# Patient Record
Sex: FEMALE | Race: WHITE | NOT HISPANIC OR LATINO
[De-identification: names, ages, dates, MRNs, and addresses within clinical notes are randomized per-mention and may not be internally consistent; named-entity substitution may affect disease eponyms.]

---
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
## 2020-01-02 ENCOUNTER — RX RENEWAL (OUTPATIENT)
Age: 20
End: 2020-01-02

## 2020-01-02 DIAGNOSIS — E03.1 CONGENITAL HYPOTHYROIDISM W/OUT GOITER: ICD-10-CM

## 2020-01-02 LAB
25(OH)D3 SERPL-MCNC: 15.4 NG/ML
ALBUMIN SERPL ELPH-MCNC: 4.6 G/DL
ALP BLD-CCNC: 58 U/L
ALT SERPL-CCNC: 12 U/L
ANION GAP SERPL CALC-SCNC: 14 MMOL/L
AST SERPL-CCNC: 13 U/L
BASOPHILS # BLD AUTO: 0.04 K/UL
BASOPHILS NFR BLD AUTO: 0.5 %
BILIRUB SERPL-MCNC: 0.2 MG/DL
BUN SERPL-MCNC: 7 MG/DL
CALCIUM SERPL-MCNC: 9.4 MG/DL
CHLORIDE SERPL-SCNC: 103 MMOL/L
CO2 SERPL-SCNC: 22 MMOL/L
CREAT SERPL-MCNC: 0.57 MG/DL
EOSINOPHIL # BLD AUTO: 0.15 K/UL
EOSINOPHIL NFR BLD AUTO: 1.9 %
GLUCOSE SERPL-MCNC: 85 MG/DL
HCT VFR BLD CALC: 35.9 %
HGB BLD-MCNC: 11.5 G/DL
IMM GRANULOCYTES NFR BLD AUTO: 0.3 %
LYMPHOCYTES # BLD AUTO: 2.32 K/UL
LYMPHOCYTES NFR BLD AUTO: 29.7 %
MAN DIFF?: NORMAL
MCHC RBC-ENTMCNC: 28.2 PG
MCHC RBC-ENTMCNC: 32 GM/DL
MCV RBC AUTO: 88 FL
MONOCYTES # BLD AUTO: 0.54 K/UL
MONOCYTES NFR BLD AUTO: 6.9 %
NEUTROPHILS # BLD AUTO: 4.73 K/UL
NEUTROPHILS NFR BLD AUTO: 60.7 %
PLATELET # BLD AUTO: 326 K/UL
POTASSIUM SERPL-SCNC: 3.9 MMOL/L
PROT SERPL-MCNC: 7.7 G/DL
RBC # BLD: 4.08 M/UL
RBC # FLD: 13.2 %
SODIUM SERPL-SCNC: 139 MMOL/L
T3 SERPL-MCNC: 90 NG/DL
T3RU NFR SERPL: 1.1 TBI
T4 FREE SERPL-MCNC: 1.1 NG/DL
T4 SERPL-MCNC: 7 UG/DL
THYROGLOB AB SERPL-ACNC: <20 IU/ML
THYROPEROXIDASE AB SERPL IA-ACNC: <10 IU/ML
TSH SERPL-ACNC: 23.2 UIU/ML
WBC # FLD AUTO: 7.8 K/UL

## 2020-01-02 RX ORDER — ERGOCALCIFEROL 1.25 MG/1
1.25 MG CAPSULE ORAL
Qty: 4 | Refills: 2 | Status: ACTIVE | COMMUNITY
Start: 2020-01-02 | End: 1900-01-01

## 2020-01-06 LAB — T4 FREE SERPL DIALY-MCNC: 1 NG/DL

## 2020-01-10 LAB — T3REVERSE SERPL-MCNC: 14.1 NG/DL

## 2020-02-14 ENCOUNTER — RX RENEWAL (OUTPATIENT)
Age: 20
End: 2020-02-14

## 2020-04-09 ENCOUNTER — RX RENEWAL (OUTPATIENT)
Age: 20
End: 2020-04-09

## 2020-04-09 RX ORDER — ERGOCALCIFEROL 1.25 MG/1
1.25 MG CAPSULE, LIQUID FILLED ORAL
Qty: 12 | Refills: 0 | Status: ACTIVE | COMMUNITY
Start: 2020-04-09 | End: 1900-01-01

## 2020-05-04 ENCOUNTER — APPOINTMENT (OUTPATIENT)
Dept: ENDOCRINOLOGY | Facility: CLINIC | Age: 20
End: 2020-05-04

## 2020-08-26 ENCOUNTER — APPOINTMENT (OUTPATIENT)
Dept: ENDOCRINOLOGY | Facility: CLINIC | Age: 20
End: 2020-08-26

## 2021-02-02 ENCOUNTER — RX RENEWAL (OUTPATIENT)
Age: 21
End: 2021-02-02

## 2021-09-07 ENCOUNTER — RX RENEWAL (OUTPATIENT)
Age: 21
End: 2021-09-07

## 2022-03-17 ENCOUNTER — EMERGENCY (EMERGENCY)
Facility: HOSPITAL | Age: 22
LOS: 1 days | Discharge: HOME | End: 2022-03-17
Attending: EMERGENCY MEDICINE | Admitting: EMERGENCY MEDICINE
Payer: MEDICAID

## 2022-03-17 VITALS
OXYGEN SATURATION: 100 % | DIASTOLIC BLOOD PRESSURE: 73 MMHG | TEMPERATURE: 99 F | RESPIRATION RATE: 16 BRPM | SYSTOLIC BLOOD PRESSURE: 110 MMHG | HEART RATE: 114 BPM

## 2022-03-17 VITALS
OXYGEN SATURATION: 100 % | TEMPERATURE: 98 F | HEART RATE: 118 BPM | RESPIRATION RATE: 17 BRPM | DIASTOLIC BLOOD PRESSURE: 81 MMHG | SYSTOLIC BLOOD PRESSURE: 125 MMHG

## 2022-03-17 LAB
ALBUMIN SERPL ELPH-MCNC: 4.6 G/DL — SIGNIFICANT CHANGE UP (ref 3.3–5)
ALP SERPL-CCNC: 63 U/L — SIGNIFICANT CHANGE UP (ref 40–120)
ALT FLD-CCNC: 13 U/L — SIGNIFICANT CHANGE UP (ref 4–33)
ANION GAP SERPL CALC-SCNC: 15 MMOL/L — HIGH (ref 7–14)
APPEARANCE UR: SIGNIFICANT CHANGE UP
AST SERPL-CCNC: 12 U/L — SIGNIFICANT CHANGE UP (ref 4–32)
BACTERIA # UR AUTO: ABNORMAL
BASE EXCESS BLDV CALC-SCNC: -0.1 MMOL/L — SIGNIFICANT CHANGE UP (ref -2–3)
BASOPHILS # BLD AUTO: 0.04 K/UL — SIGNIFICANT CHANGE UP (ref 0–0.2)
BASOPHILS NFR BLD AUTO: 0.2 % — SIGNIFICANT CHANGE UP (ref 0–2)
BILIRUB SERPL-MCNC: 0.6 MG/DL — SIGNIFICANT CHANGE UP (ref 0.2–1.2)
BILIRUB UR-MCNC: NEGATIVE — SIGNIFICANT CHANGE UP
BLOOD GAS VENOUS COMPREHENSIVE RESULT: SIGNIFICANT CHANGE UP
BUN SERPL-MCNC: 8 MG/DL — SIGNIFICANT CHANGE UP (ref 7–23)
CALCIUM SERPL-MCNC: 9.7 MG/DL — SIGNIFICANT CHANGE UP (ref 8.4–10.5)
CHLORIDE BLDV-SCNC: 100 MMOL/L — SIGNIFICANT CHANGE UP (ref 96–108)
CHLORIDE SERPL-SCNC: 98 MMOL/L — SIGNIFICANT CHANGE UP (ref 98–107)
CO2 BLDV-SCNC: 26.8 MMOL/L — HIGH (ref 22–26)
CO2 SERPL-SCNC: 22 MMOL/L — SIGNIFICANT CHANGE UP (ref 22–31)
COLOR SPEC: YELLOW — SIGNIFICANT CHANGE UP
COMMENT - URINE: SIGNIFICANT CHANGE UP
CREAT SERPL-MCNC: 0.69 MG/DL — SIGNIFICANT CHANGE UP (ref 0.5–1.3)
DIFF PNL FLD: ABNORMAL
EGFR: 127 ML/MIN/1.73M2 — SIGNIFICANT CHANGE UP
EOSINOPHIL # BLD AUTO: 0.01 K/UL — SIGNIFICANT CHANGE UP (ref 0–0.5)
EOSINOPHIL NFR BLD AUTO: 0 % — SIGNIFICANT CHANGE UP (ref 0–6)
EPI CELLS # UR: 5 /HPF — SIGNIFICANT CHANGE UP (ref 0–5)
FLUAV AG NPH QL: SIGNIFICANT CHANGE UP
FLUBV AG NPH QL: SIGNIFICANT CHANGE UP
GAS PNL BLDV: 135 MMOL/L — LOW (ref 136–145)
GLUCOSE BLDV-MCNC: 108 MG/DL — HIGH (ref 70–99)
GLUCOSE SERPL-MCNC: 104 MG/DL — HIGH (ref 70–99)
GLUCOSE UR QL: NEGATIVE — SIGNIFICANT CHANGE UP
HCG SERPL-ACNC: <5 MIU/ML — SIGNIFICANT CHANGE UP
HCO3 BLDV-SCNC: 25 MMOL/L — SIGNIFICANT CHANGE UP (ref 22–29)
HCT VFR BLD CALC: 36.4 % — SIGNIFICANT CHANGE UP (ref 34.5–45)
HCT VFR BLDA CALC: 35 % — SIGNIFICANT CHANGE UP (ref 34.5–46.5)
HGB BLD CALC-MCNC: 11.7 G/DL — SIGNIFICANT CHANGE UP (ref 11.5–15.5)
HGB BLD-MCNC: 11.6 G/DL — SIGNIFICANT CHANGE UP (ref 11.5–15.5)
IANC: 18.93 K/UL — HIGH (ref 1.5–8.5)
IMM GRANULOCYTES NFR BLD AUTO: 0.6 % — SIGNIFICANT CHANGE UP (ref 0–1.5)
KETONES UR-MCNC: ABNORMAL
LACTATE BLDV-MCNC: 2.1 MMOL/L — HIGH (ref 0.5–2)
LEUKOCYTE ESTERASE UR-ACNC: NEGATIVE — SIGNIFICANT CHANGE UP
LIDOCAIN IGE QN: 15 U/L — SIGNIFICANT CHANGE UP (ref 7–60)
LYMPHOCYTES # BLD AUTO: 2.11 K/UL — SIGNIFICANT CHANGE UP (ref 1–3.3)
LYMPHOCYTES # BLD AUTO: 9 % — LOW (ref 13–44)
MCHC RBC-ENTMCNC: 27.5 PG — SIGNIFICANT CHANGE UP (ref 27–34)
MCHC RBC-ENTMCNC: 31.9 GM/DL — LOW (ref 32–36)
MCV RBC AUTO: 86.3 FL — SIGNIFICANT CHANGE UP (ref 80–100)
MONOCYTES # BLD AUTO: 2.12 K/UL — HIGH (ref 0–0.9)
MONOCYTES NFR BLD AUTO: 9.1 % — SIGNIFICANT CHANGE UP (ref 2–14)
NEUTROPHILS # BLD AUTO: 18.93 K/UL — HIGH (ref 1.8–7.4)
NEUTROPHILS NFR BLD AUTO: 81.1 % — HIGH (ref 43–77)
NITRITE UR-MCNC: NEGATIVE — SIGNIFICANT CHANGE UP
NRBC # BLD: 0 /100 WBCS — SIGNIFICANT CHANGE UP
NRBC # FLD: 0 K/UL — SIGNIFICANT CHANGE UP
PCO2 BLDV: 44 MMHG — HIGH (ref 39–42)
PH BLDV: 7.37 — SIGNIFICANT CHANGE UP (ref 7.32–7.43)
PH UR: 6 — SIGNIFICANT CHANGE UP (ref 5–8)
PLATELET # BLD AUTO: 331 K/UL — SIGNIFICANT CHANGE UP (ref 150–400)
PO2 BLDV: 28 MMHG — SIGNIFICANT CHANGE UP
POTASSIUM BLDV-SCNC: 3.8 MMOL/L — SIGNIFICANT CHANGE UP (ref 3.5–5.1)
POTASSIUM SERPL-MCNC: 3.7 MMOL/L — SIGNIFICANT CHANGE UP (ref 3.5–5.3)
POTASSIUM SERPL-SCNC: 3.7 MMOL/L — SIGNIFICANT CHANGE UP (ref 3.5–5.3)
PROT SERPL-MCNC: 7.9 G/DL — SIGNIFICANT CHANGE UP (ref 6–8.3)
PROT UR-MCNC: ABNORMAL
RBC # BLD: 4.22 M/UL — SIGNIFICANT CHANGE UP (ref 3.8–5.2)
RBC # FLD: 13.4 % — SIGNIFICANT CHANGE UP (ref 10.3–14.5)
RBC CASTS # UR COMP ASSIST: 25 /HPF — SIGNIFICANT CHANGE UP (ref 0–4)
RSV RNA NPH QL NAA+NON-PROBE: SIGNIFICANT CHANGE UP
SAO2 % BLDV: 43.5 % — SIGNIFICANT CHANGE UP
SARS-COV-2 RNA SPEC QL NAA+PROBE: SIGNIFICANT CHANGE UP
SODIUM SERPL-SCNC: 135 MMOL/L — SIGNIFICANT CHANGE UP (ref 135–145)
SP GR SPEC: 1.05 — SIGNIFICANT CHANGE UP (ref 1–1.05)
UROBILINOGEN FLD QL: ABNORMAL
WBC # BLD: 23.35 K/UL — HIGH (ref 3.8–10.5)
WBC # FLD AUTO: 23.35 K/UL — HIGH (ref 3.8–10.5)
WBC UR QL: 3 /HPF — SIGNIFICANT CHANGE UP (ref 0–5)

## 2022-03-17 PROCEDURE — 99285 EMERGENCY DEPT VISIT HI MDM: CPT

## 2022-03-17 PROCEDURE — 74177 CT ABD & PELVIS W/CONTRAST: CPT | Mod: 26,MA

## 2022-03-17 PROCEDURE — 76857 US EXAM PELVIC LIMITED: CPT | Mod: 26

## 2022-03-17 RX ORDER — KETOROLAC TROMETHAMINE 30 MG/ML
30 SYRINGE (ML) INJECTION ONCE
Refills: 0 | Status: DISCONTINUED | OUTPATIENT
Start: 2022-03-17 | End: 2022-03-17

## 2022-03-17 RX ORDER — ONDANSETRON 8 MG/1
4 TABLET, FILM COATED ORAL ONCE
Refills: 0 | Status: COMPLETED | OUTPATIENT
Start: 2022-03-17 | End: 2022-03-17

## 2022-03-17 RX ORDER — MOXIFLOXACIN HYDROCHLORIDE TABLETS, 400 MG 400 MG/1
1 TABLET, FILM COATED ORAL
Qty: 14 | Refills: 0
Start: 2022-03-17 | End: 2022-03-23

## 2022-03-17 RX ORDER — SODIUM CHLORIDE 9 MG/ML
1000 INJECTION INTRAMUSCULAR; INTRAVENOUS; SUBCUTANEOUS ONCE
Refills: 0 | Status: COMPLETED | OUTPATIENT
Start: 2022-03-17 | End: 2022-03-17

## 2022-03-17 RX ORDER — CIPROFLOXACIN LACTATE 400MG/40ML
500 VIAL (ML) INTRAVENOUS ONCE
Refills: 0 | Status: COMPLETED | OUTPATIENT
Start: 2022-03-17 | End: 2022-03-17

## 2022-03-17 RX ORDER — METRONIDAZOLE 500 MG
500 TABLET ORAL ONCE
Refills: 0 | Status: COMPLETED | OUTPATIENT
Start: 2022-03-17 | End: 2022-03-17

## 2022-03-17 RX ORDER — METRONIDAZOLE 500 MG
1 TABLET ORAL
Qty: 21 | Refills: 0
Start: 2022-03-17 | End: 2022-03-23

## 2022-03-17 RX ORDER — MULTIVIT WITH MIN/MFOLATE/K2 340-15/3 G
1 POWDER (GRAM) ORAL ONCE
Refills: 0 | Status: COMPLETED | OUTPATIENT
Start: 2022-03-17 | End: 2022-03-17

## 2022-03-17 RX ORDER — FAMOTIDINE 10 MG/ML
20 INJECTION INTRAVENOUS ONCE
Refills: 0 | Status: COMPLETED | OUTPATIENT
Start: 2022-03-17 | End: 2022-03-17

## 2022-03-17 RX ORDER — ACETAMINOPHEN 500 MG
650 TABLET ORAL ONCE
Refills: 0 | Status: COMPLETED | OUTPATIENT
Start: 2022-03-17 | End: 2022-03-17

## 2022-03-17 RX ADMIN — Medication 30 MILLIGRAM(S): at 14:08

## 2022-03-17 RX ADMIN — Medication 500 MILLIGRAM(S): at 18:57

## 2022-03-17 RX ADMIN — ONDANSETRON 4 MILLIGRAM(S): 8 TABLET, FILM COATED ORAL at 14:08

## 2022-03-17 RX ADMIN — Medication 650 MILLIGRAM(S): at 14:04

## 2022-03-17 RX ADMIN — Medication 1 BOTTLE: at 14:27

## 2022-03-17 RX ADMIN — Medication 30 MILLILITER(S): at 14:04

## 2022-03-17 RX ADMIN — FAMOTIDINE 20 MILLIGRAM(S): 10 INJECTION INTRAVENOUS at 14:12

## 2022-03-17 RX ADMIN — SODIUM CHLORIDE 1000 MILLILITER(S): 9 INJECTION INTRAMUSCULAR; INTRAVENOUS; SUBCUTANEOUS at 14:23

## 2022-03-17 NOTE — ED PROVIDER NOTE - CLINICAL SUMMARY MEDICAL DECISION MAKING FREE TEXT BOX
Patient is a 21 year-old-female with no past medical history presents with 3-day history of LLQ/LUQ abdominal pain and nausea. Mildly tachycardic. Most likely gastroenteritis/colitis/constipation. However will r/o ovarian torsion/cyst. Will also evaluate for urinary tract infection. CBC, CMP, VBG, lipase, UA, UC. Symptomatic treatment with fluids, tylenol, maalox, pepcid, toradol, zofran and mg citrate. Transabdominal u/s to r/o torsion. Patient is a 21 year-old-female with no past medical history presents with 3-day history of LLQ/LUQ abdominal pain and nausea. Mildly tachycardic. Most likely gastroenteritis/colitis/constipation. However will r/o ovarian torsion/cyst. Will also evaluate for urinary tract infection. CBC, CMP, VBG, lipase, UA, UC. Symptomatic treatment with fluids, tylenol, maalox, pepcid, toradol, zofran and mg citrate. Transabdominal u/s to r/o torsion. Pelvic exam deferred since patient has never been sexually active.

## 2022-03-17 NOTE — ED ADULT NURSE NOTE - NSIMPLEMENTINTERV_GEN_ALL_ED
Implemented All Universal Safety Interventions:  [place] to call system. Call bell, personal items and telephone within reach. Instruct patient to call for assistance. Room bathroom lighting operational. Non-slip footwear when patient is off stretcher. Physically safe environment: no spills, clutter or unnecessary equipment. Stretcher in lowest position, wheels locked, appropriate side rails in place.

## 2022-03-17 NOTE — ED PROVIDER NOTE - PATIENT PORTAL LINK FT
You can access the FollowMyHealth Patient Portal offered by [facility] by registering at the following website: [email or web address]. By joining [employer]’s FollowMyHealth portal, you will also be able to view your health information using other applications (apps) compatible with our system.

## 2022-03-17 NOTE — ED PROVIDER NOTE - ATTENDING CONTRIBUTION TO CARE
I performed a face-to-face evaluation of the patient and performed a history and physical examination. I agree with the history and physical examination. If this was a PA visit, I personally saw the patient with the PA and performed a substantive portion of the visit including all aspects of the medical decision making.    Three days ago, ate Chinese food noodles. Since then, has had roving abdominal pain between right upper quadrant to the left lower quadrant and left upper quadrant. Has not had a bowel movement for three days. Does not feel impaction. No fevers/vomiting/diarrhea. Physical exam reveals tenderness in several locations in the afternoon but no distention. Differential diagnosis include food poisoning-related constipation. Consider also appendicitis. Give magnesium citrate. If no response with relief of pain, pursue labs and imaging.

## 2022-03-17 NOTE — ED PROVIDER NOTE - PHYSICAL EXAMINATION
General: non-toxic appearing, in no respiratory distress  HEENT: atraumatic, normocephalic; pupils are equal, round and react to light, extraocular movements intact bilaterally without deficits, no conjunctival pallor, mucous membranes moist  Neck: no jugular venous distension, full range of motion  Chest/Lung: clear to auscultation bilaterally, no wheezes/rhonchi/rales  Heart: regular rate and rhythm, no murmur/gallops/rubs  Abdomen: normal bowel sounds, soft, tenderness in LLQ and LUQ  Extremities: no lower extremity edema, +2 radial pulses bilaterally, +2 dorsalis pedis pulses bilaterally  Musculoskeletal: full range of motion of all 4 extremities  Nervous System: alert and oriented, no motor deficits or sensory deficits; CNII-XII grossly intact; no focal neurologic deficits  Skin: no rashes/lacerations noted  Pelvic exam: deferred since patient has never been sexually active General: non-toxic appearing, in no respiratory distress  HEENT: atraumatic, normocephalic; pupils are equal, round and react to light, extraocular movements intact bilaterally without deficits, no conjunctival pallor, mucous membranes moist  Neck: no jugular venous distension, full range of motion  Chest/Lung: clear to auscultation bilaterally, no wheezes/rhonchi/rales  Heart: regular rate and rhythm, no murmur/gallops/rubs  Abdomen: normal bowel sounds, soft, mild tenderness in LLQ and LUQ  Extremities: no lower extremity edema, +2 radial pulses bilaterally, +2 dorsalis pedis pulses bilaterally  Musculoskeletal: full range of motion of all 4 extremities  Nervous System: alert and oriented, no motor deficits or sensory deficits; CNII-XII grossly intact; no focal neurologic deficits  Skin: no rashes/lacerations noted  Pelvic exam: deferred since patient has never been sexually active

## 2022-03-17 NOTE — ED PROVIDER NOTE - NSFOLLOWUPCLINICS_GEN_ALL_ED_FT
Gastroenterology at [facility]  Gastroenterology  [address] [place] [address]  Phone: [phone]  Fax:     [facility] [place]  Gastroenterology  [address] [place] [address]  Phone: [phone]  Fax:     [facility]  Gastroenterology  [address]  [place] [address]  Phone: [phone]  Fax:

## 2022-03-17 NOTE — ED ADULT NURSE NOTE - OBJECTIVE STATEMENT
Patient AAOx4, ambulatory at baseline. Patient coming to ER complaining of abdominal pain, and nausea. Patient states she has not had a bowel movement since Monday morning. Patient denies chest pain, headache and dizziness at this time. Breathing even and unlabored. Patient states she has not experienced any episodes of vomiting at this time. Patient family member at bedside noted that the patient has tried multiple modalities to try to have a bowel movement. Patient abdomen distended and tender upon palpation. Bowel sounds present in all four quadrants. No pallor or diaphoresis noted at this time.  Patient denies the use of any medications or having any medical problems. #20g placed to LAC. Labs drawn and sent as per MD order.

## 2022-03-17 NOTE — ED PROVIDER NOTE - OBJECTIVE STATEMENT
Patient is a 21 year-old-female with no past medical history presents with 3-day history of abdominal pain. Started on Monday, few hours after eating some noddles per patient, pain mostly in the left side of the abdomen, +nausea. Has not had any PO intake since Monday due to fear of pain and nausea, also has not had a bowel movement since then. Never had similar pain before. Denies fever, chills, dysuria, hematuria, increased urinary frequency.   LMP last month, never sexually active.

## 2022-03-17 NOTE — ED PROVIDER NOTE - PROGRESS NOTE DETAILS
[place] PGY1   Patient reports no improvement in her symptoms with medications. Tenderness noted over the LLQ and LUQ. Will pursue CT abd/pel. [facility] PGY1   CT abd/pel demonstrated infectious/inflammatory colitis of the descending and proximal sigmoid colon and also findings suggesting inflammatory bowel disease (possibly ulcerative colitis).   Patient appears nontoxic. Will discharge with GI follow up.   Patient and mother at bedside updated. [facility] PGY1  Patient's HR is still elevated at 114, most likely due to dehydration in the setting of poor oral intake for 3 days. Patient still reports persistent pain. Offers CDU admission for better pain management and IV hydration with GI consult in the afternoon. However mother declines CDU admission and would like to take her daughter to another emergency department. GI referral list provided to mother.  Expedited GI follow up. [facility] PGY1   CT abd/pel demonstrated infectious/inflammatory colitis of the descending and proximal sigmoid colon and also findings suggesting inflammatory bowel disease (possibly ulcerative colitis).   Patient appears nontoxic. Will discharge with GI follow up. [place] PGY1   Patient reports no improvement in her symptoms with medications. Mild tenderness noted over the LLQ and LUQ. Will pursue CT abd/pel. Shahzad: Pt. w/ colitis. Although no V/D/F, has chills. Will use Cipro/Flagyl and Miralax.

## 2022-03-17 NOTE — ED PROVIDER NOTE - NSFOLLOWUPINSTRUCTIONS_ED_ALL_ED_FT
You were seen in the emergency department for abdominal pain and nausea. You were given fluids, tylenol, maalox, pepcid, toradol, zofran and magnesium citrate.     For pain, you may take Tylenol (acetaminophen) and/or ibuprofen (advil or motrin). Please follow the instructions on the label/container.     Please follow up with your primary care doctor within 48 hours for continuation of care.   Please follow up with an GI doctor within a week for further management of colitis and possible inflammatory bowel disease.     Return to the emergency department if you experience any new/concerning/worsening symptoms such as but not limited to: fever (>100.3F), intractable nausea, vomiting, severe abdominal pain.  ==========================  Ulcerative Colitis    WHAT YOU NEED TO KNOW:  What is ulcerative colitis? Ulcerative colitis is a chronic disease of the colon (large intestine). Inflammation and ulcers form on the inner lining of your colon. Ulcerative colitis is a type of inflammatory bowel disease. It usually starts between the ages of 15 and 30 years old.    What are the signs and symptoms of ulcerative colitis?   •Diarrhea that may have mucus or blood  •Bleeding from your rectum  •Urgent bowel movements  •Abdominal tenderness and bloating  •Fever, poor appetite, weight loss, fatigue    How is ulcerative colitis diagnosed?   •A rectal exam may show blood. Your healthcare provider will put a gloved finger inside your rectum. He or she will feel for inflammation or blockage.   •A CT or MRI may show a blockage, an abscess, inflammation, or abnormal connection. You may be given contrast liquid to help your colon show up better in the pictures. Tell the healthcare provider if you have ever had an allergic reaction to contrast liquid. Do not enter the MRI room with anything metal. Metal can cause serious injury. Tell the healthcare provider if you have any metal in or on your body.  •Endoscopy is a procedure used to find the cause of your ulcerative colitis. An endoscope is a bendable tube with a light and camera on the end. A small sample of tissue and bowel movement may be removed. These are sent to a lab for testing.     How is ulcerative colitis treated?   •Medicines may be given to help decrease inflammation or control your immune system. You may need to take more than 1 medicine to treat your ulcerative colitis.  •Surgery may be needed to remove part or all of your colon. Ask about the different kinds of surgery that can be done to help your symptoms.     How can I manage my ulcerative colitis?   •Do not take NSAID medicines, including aspirin and ibuprofen. NSAIDs can cause flare-ups.  •Eat a variety of healthy foods to keep your colon healthy. Healthy foods include fruit, vegetables, whole-grain breads, low-fat dairy products, beans, lean meat, and fish. Do not eat foods that make your symptoms worse. Your healthcare provider may give you vitamins or minerals to improve your nutrition if you have severe ulcerative colitis.   •Drink liquids as directed. Ask how much liquid to drink each day and which liquids are best for you. For most people, water, juice, and milk are good choices. Do not drink alcohol. This can make your symptoms worse.  •Exercise regularly. Ask about the best exercise plan for you. Any activity is better than none. Even 10 minutes a few times a day would help prevent constipation and help keep your colon healthy.   •Manage stress. Stress may slow healing and cause illness. Learn new ways to relax, such as deep breathing.     Call, or have someone call, your local emergency number (911 in the [place]) if:   •You have sudden trouble breathing.  •You have a fast heart rate, fast breathing, or are too dizzy to stand up.    When should I seek immediate care?   •You have severe abdominal pain.  •Your vomit has blood in it or looks like coffee grounds.  •You see blood in your bowel movement.     When should I call my doctor?   •You have a fever, chills, a cough, or feel weak and achy.  •You have abdominal pain that does not go away or gets worse after you take medicine.  •Your abdomen is swollen.   •You lose weight without trying.  •You have questions or concerns about your condition or care.    CARE AGREEMENT:    You have the right to help plan your care. Learn about your health condition and how it may be treated. Discuss treatment options with your healthcare providers to decide what care you want to receive. You always have the right to refuse treatment. You were seen in the emergency department for abdominal pain and nausea. You were given fluids, tylenol, maalox, pepcid, toradol, zofran and magnesium citrate. Your workup includes bloodwork, urinalysis, urine culture, ultrasound of your pelvis and also a CT scan of your abdomen and pelvis.    Please follow up with your primary care doctor within 48 hours for continuation of care.   Please follow up with an GI doctor within a week for further management of colitis and possible inflammatory bowel disease.     Return to the emergency department if you experience any new/concerning/worsening symptoms such as but not limited to: fever (>100.3F), intractable nausea, vomiting, severe abdominal pain.  ==========================  Ulcerative Colitis    WHAT YOU NEED TO KNOW:  What is ulcerative colitis? Ulcerative colitis is a chronic disease of the colon (large intestine). Inflammation and ulcers form on the inner lining of your colon. Ulcerative colitis is a type of inflammatory bowel disease. It usually starts between the ages of 15 and 30 years old.    What are the signs and symptoms of ulcerative colitis?   •Diarrhea that may have mucus or blood  •Bleeding from your rectum  •Urgent bowel movements  •Abdominal tenderness and bloating  •Fever, poor appetite, weight loss, fatigue    How is ulcerative colitis diagnosed?   •A rectal exam may show blood. Your healthcare provider will put a gloved finger inside your rectum. He or she will feel for inflammation or blockage.   •A CT or MRI may show a blockage, an abscess, inflammation, or abnormal connection. You may be given contrast liquid to help your colon show up better in the pictures. Tell the healthcare provider if you have ever had an allergic reaction to contrast liquid. Do not enter the MRI room with anything metal. Metal can cause serious injury. Tell the healthcare provider if you have any metal in or on your body.  •Endoscopy is a procedure used to find the cause of your ulcerative colitis. An endoscope is a bendable tube with a light and camera on the end. A small sample of tissue and bowel movement may be removed. These are sent to a lab for testing.     How is ulcerative colitis treated?   •Medicines may be given to help decrease inflammation or control your immune system. You may need to take more than 1 medicine to treat your ulcerative colitis.  •Surgery may be needed to remove part or all of your colon. Ask about the different kinds of surgery that can be done to help your symptoms.     How can I manage my ulcerative colitis?   •Do not take NSAID medicines, including aspirin and ibuprofen. NSAIDs can cause flare-ups.  •Eat a variety of healthy foods to keep your colon healthy. Healthy foods include fruit, vegetables, whole-grain breads, low-fat dairy products, beans, lean meat, and fish. Do not eat foods that make your symptoms worse. Your healthcare provider may give you vitamins or minerals to improve your nutrition if you have severe ulcerative colitis.   •Drink liquids as directed. Ask how much liquid to drink each day and which liquids are best for you. For most people, water, juice, and milk are good choices. Do not drink alcohol. This can make your symptoms worse.  •Exercise regularly. Ask about the best exercise plan for you. Any activity is better than none. Even 10 minutes a few times a day would help prevent constipation and help keep your colon healthy.   •Manage stress. Stress may slow healing and cause illness. Learn new ways to relax, such as deep breathing.     Call, or have someone call, your local emergency number (911 in the [place]) if:   •You have sudden trouble breathing.  •You have a fast heart rate, fast breathing, or are too dizzy to stand up.    When should I seek immediate care?   •You have severe abdominal pain.  •Your vomit has blood in it or looks like coffee grounds.  •You see blood in your bowel movement.     When should I call my doctor?   •You have a fever, chills, a cough, or feel weak and achy.  •You have abdominal pain that does not go away or gets worse after you take medicine.  •Your abdomen is swollen.   •You lose weight without trying.  •You have questions or concerns about your condition or care.    CARE AGREEMENT:    You have the right to help plan your care. Learn about your health condition and how it may be treated. Discuss treatment options with your healthcare providers to decide what care you want to receive. You always have the right to refuse treatment. You were seen in the emergency department for abdominal pain and nausea. You were given fluids, tylenol, maalox, pepcid, toradol, zofran and magnesium citrate. Your workup includes bloodwork, urinalysis, urine culture, ultrasound of your pelvis and also a CT scan of your abdomen and pelvis.    You were prescribed:  - Ciprofloxacin: take 500mg twice a day for 7 days   - Metronidazole: take 500mg three times a day for 7 days    Please follow up with your primary care doctor within 48 hours for continuation of care.   Please follow up with an GI doctor within a week for further management of colitis and possible inflammatory bowel disease.     Return to the emergency department if you experience any new/concerning/worsening symptoms such as but not limited to: fever (>100.3F), intractable nausea, vomiting, severe abdominal pain.  ==========================  Ulcerative Colitis    WHAT YOU NEED TO KNOW:  What is ulcerative colitis? Ulcerative colitis is a chronic disease of the colon (large intestine). Inflammation and ulcers form on the inner lining of your colon. Ulcerative colitis is a type of inflammatory bowel disease. It usually starts between the ages of 15 and 30 years old.    What are the signs and symptoms of ulcerative colitis?   •Diarrhea that may have mucus or blood  •Bleeding from your rectum  •Urgent bowel movements  •Abdominal tenderness and bloating  •Fever, poor appetite, weight loss, fatigue    How is ulcerative colitis diagnosed?   •A rectal exam may show blood. Your healthcare provider will put a gloved finger inside your rectum. He or she will feel for inflammation or blockage.   •A CT or MRI may show a blockage, an abscess, inflammation, or abnormal connection. You may be given contrast liquid to help your colon show up better in the pictures. Tell the healthcare provider if you have ever had an allergic reaction to contrast liquid. Do not enter the MRI room with anything metal. Metal can cause serious injury. Tell the healthcare provider if you have any metal in or on your body.  •Endoscopy is a procedure used to find the cause of your ulcerative colitis. An endoscope is a bendable tube with a light and camera on the end. A small sample of tissue and bowel movement may be removed. These are sent to a lab for testing.     How is ulcerative colitis treated?   •Medicines may be given to help decrease inflammation or control your immune system. You may need to take more than 1 medicine to treat your ulcerative colitis.  •Surgery may be needed to remove part or all of your colon. Ask about the different kinds of surgery that can be done to help your symptoms.     How can I manage my ulcerative colitis?   •Do not take NSAID medicines, including aspirin and ibuprofen. NSAIDs can cause flare-ups.  •Eat a variety of healthy foods to keep your colon healthy. Healthy foods include fruit, vegetables, whole-grain breads, low-fat dairy products, beans, lean meat, and fish. Do not eat foods that make your symptoms worse. Your healthcare provider may give you vitamins or minerals to improve your nutrition if you have severe ulcerative colitis.   •Drink liquids as directed. Ask how much liquid to drink each day and which liquids are best for you. For most people, water, juice, and milk are good choices. Do not drink alcohol. This can make your symptoms worse.  •Exercise regularly. Ask about the best exercise plan for you. Any activity is better than none. Even 10 minutes a few times a day would help prevent constipation and help keep your colon healthy.   •Manage stress. Stress may slow healing and cause illness. Learn new ways to relax, such as deep breathing.     Call, or have someone call, your local emergency number (911 in the [place]) if:   •You have sudden trouble breathing.  •You have a fast heart rate, fast breathing, or are too dizzy to stand up.    When should I seek immediate care?   •You have severe abdominal pain.  •Your vomit has blood in it or looks like coffee grounds.  •You see blood in your bowel movement.     When should I call my doctor?   •You have a fever, chills, a cough, or feel weak and achy.  •You have abdominal pain that does not go away or gets worse after you take medicine.  •Your abdomen is swollen.   •You lose weight without trying.  •You have questions or concerns about your condition or care.    CARE AGREEMENT:    You have the right to help plan your care. Learn about your health condition and how it may be treated. Discuss treatment options with your healthcare providers to decide what care you want to receive. You always have the right to refuse treatment.

## 2022-03-19 LAB
CULTURE RESULTS: SIGNIFICANT CHANGE UP
SPECIMEN SOURCE: SIGNIFICANT CHANGE UP

## 2022-03-28 ENCOUNTER — RX RENEWAL (OUTPATIENT)
Age: 22
End: 2022-03-28

## 2022-06-07 NOTE — ED PROVIDER NOTE - NSPTACCESSSVCSAPPTDETAILS_ED_ALL_ED_FT
Addended by: VIVIANE APRIL on: 6/7/2022 09:14 AM     Modules accepted: Noris     Please follow up with GI within a week for further management of colitis/ulcerative colitis. Please follow up with GI for further management of colitis/ulcerative colitis.